# Patient Record
Sex: MALE | Race: OTHER | Employment: OTHER | URBAN - METROPOLITAN AREA
[De-identification: names, ages, dates, MRNs, and addresses within clinical notes are randomized per-mention and may not be internally consistent; named-entity substitution may affect disease eponyms.]

---

## 2022-09-22 ENCOUNTER — OFFICE VISIT (OUTPATIENT)
Dept: INTERNAL MEDICINE CLINIC | Facility: CLINIC | Age: 32
End: 2022-09-22

## 2022-09-22 VITALS
HEART RATE: 66 BPM | HEIGHT: 71 IN | WEIGHT: 200 LBS | DIASTOLIC BLOOD PRESSURE: 83 MMHG | SYSTOLIC BLOOD PRESSURE: 137 MMHG | BODY MASS INDEX: 28 KG/M2

## 2022-09-22 DIAGNOSIS — Z13.29 THYROID DISORDER SCREEN: ICD-10-CM

## 2022-09-22 DIAGNOSIS — F32.A DEPRESSION, UNSPECIFIED DEPRESSION TYPE: ICD-10-CM

## 2022-09-22 DIAGNOSIS — Z13.21 ENCOUNTER FOR VITAMIN DEFICIENCY SCREENING: ICD-10-CM

## 2022-09-22 DIAGNOSIS — Z13.0 SCREENING FOR DEFICIENCY ANEMIA: ICD-10-CM

## 2022-09-22 DIAGNOSIS — M62.81 MUSCLE STRENGTH REDUCED: ICD-10-CM

## 2022-09-22 DIAGNOSIS — Z13.220 LIPID SCREENING: ICD-10-CM

## 2022-09-22 DIAGNOSIS — Z00.00 WELLNESS EXAMINATION: Primary | ICD-10-CM

## 2022-09-22 PROCEDURE — 99385 PREV VISIT NEW AGE 18-39: CPT | Performed by: NURSE PRACTITIONER

## 2022-09-22 PROCEDURE — 3079F DIAST BP 80-89 MM HG: CPT | Performed by: NURSE PRACTITIONER

## 2022-09-22 PROCEDURE — 3075F SYST BP GE 130 - 139MM HG: CPT | Performed by: NURSE PRACTITIONER

## 2022-09-22 PROCEDURE — 3008F BODY MASS INDEX DOCD: CPT | Performed by: NURSE PRACTITIONER

## 2022-09-22 RX ORDER — BUPROPION HYDROCHLORIDE 150 MG/1
TABLET ORAL
COMMUNITY
Start: 2022-07-12 | End: 2022-09-22

## 2022-09-22 RX ORDER — DEXTROAMPHETAMINE SACCHARATE, AMPHETAMINE ASPARTATE, DEXTROAMPHETAMINE SULFATE AND AMPHETAMINE SULFATE 1.25; 1.25; 1.25; 1.25 MG/1; MG/1; MG/1; MG/1
TABLET ORAL
COMMUNITY
Start: 2022-07-24

## 2022-09-22 RX ORDER — BUPROPION HYDROCHLORIDE 150 MG/1
150 TABLET ORAL DAILY
Qty: 90 TABLET | Refills: 3 | Status: SHIPPED | OUTPATIENT
Start: 2022-09-22

## 2022-09-23 ENCOUNTER — LAB ENCOUNTER (OUTPATIENT)
Dept: LAB | Facility: HOSPITAL | Age: 32
End: 2022-09-23
Attending: NURSE PRACTITIONER

## 2022-09-23 DIAGNOSIS — M62.81 MUSCLE WEAKNESS (GENERALIZED): Primary | ICD-10-CM

## 2022-09-23 LAB
ALBUMIN SERPL-MCNC: 3.7 G/DL (ref 3.4–5)
ALBUMIN/GLOB SERPL: 0.9 {RATIO} (ref 1–2)
ALP LIVER SERPL-CCNC: 63 U/L
ALT SERPL-CCNC: 29 U/L
ANION GAP SERPL CALC-SCNC: 6 MMOL/L (ref 0–18)
AST SERPL-CCNC: 13 U/L (ref 15–37)
BASOPHILS # BLD AUTO: 0.02 X10(3) UL (ref 0–0.2)
BASOPHILS NFR BLD AUTO: 0.4 %
BILIRUB SERPL-MCNC: 0.5 MG/DL (ref 0.1–2)
BUN BLD-MCNC: 14 MG/DL (ref 7–18)
BUN/CREAT SERPL: 13.2 (ref 10–20)
CALCIUM BLD-MCNC: 8.5 MG/DL (ref 8.5–10.1)
CHLORIDE SERPL-SCNC: 106 MMOL/L (ref 98–112)
CHOLEST SERPL-MCNC: 152 MG/DL (ref ?–200)
CO2 SERPL-SCNC: 27 MMOL/L (ref 21–32)
CREAT BLD-MCNC: 1.06 MG/DL
DEPRECATED RDW RBC AUTO: 39.8 FL (ref 35.1–46.3)
EOSINOPHIL # BLD AUTO: 0.12 X10(3) UL (ref 0–0.7)
EOSINOPHIL NFR BLD AUTO: 2.6 %
ERYTHROCYTE [DISTWIDTH] IN BLOOD BY AUTOMATED COUNT: 12 % (ref 11–15)
FASTING PATIENT LIPID ANSWER: YES
FASTING STATUS PATIENT QL REPORTED: YES
GFR SERPLBLD BASED ON 1.73 SQ M-ARVRAT: 94 ML/MIN/1.73M2 (ref 60–?)
GLOBULIN PLAS-MCNC: 4 G/DL (ref 2.8–4.4)
GLUCOSE BLD-MCNC: 88 MG/DL (ref 70–99)
HCT VFR BLD AUTO: 45.3 %
HDLC SERPL-MCNC: 45 MG/DL (ref 40–59)
HGB BLD-MCNC: 15.3 G/DL
IMM GRANULOCYTES # BLD AUTO: 0.02 X10(3) UL (ref 0–1)
IMM GRANULOCYTES NFR BLD: 0.4 %
LDLC SERPL CALC-MCNC: 91 MG/DL (ref ?–100)
LYMPHOCYTES # BLD AUTO: 1.72 X10(3) UL (ref 1–4)
LYMPHOCYTES NFR BLD AUTO: 37.1 %
MCH RBC QN AUTO: 30.2 PG (ref 26–34)
MCHC RBC AUTO-ENTMCNC: 33.8 G/DL (ref 31–37)
MCV RBC AUTO: 89.3 FL
MONOCYTES # BLD AUTO: 0.56 X10(3) UL (ref 0.1–1)
MONOCYTES NFR BLD AUTO: 12.1 %
NEUTROPHILS # BLD AUTO: 2.19 X10 (3) UL (ref 1.5–7.7)
NEUTROPHILS # BLD AUTO: 2.19 X10(3) UL (ref 1.5–7.7)
NEUTROPHILS NFR BLD AUTO: 47.4 %
NONHDLC SERPL-MCNC: 107 MG/DL (ref ?–130)
OSMOLALITY SERPL CALC.SUM OF ELEC: 288 MOSM/KG (ref 275–295)
PLATELET # BLD AUTO: 197 10(3)UL (ref 150–450)
POTASSIUM SERPL-SCNC: 3.9 MMOL/L (ref 3.5–5.1)
PROT SERPL-MCNC: 7.7 G/DL (ref 6.4–8.2)
RBC # BLD AUTO: 5.07 X10(6)UL
SODIUM SERPL-SCNC: 139 MMOL/L (ref 136–145)
TRIGL SERPL-MCNC: 83 MG/DL (ref 30–149)
TSI SER-ACNC: 0.9 MIU/ML (ref 0.36–3.74)
VIT D+METAB SERPL-MCNC: 31 NG/ML (ref 30–100)
VLDLC SERPL CALC-MCNC: 13 MG/DL (ref 0–30)
WBC # BLD AUTO: 4.6 X10(3) UL (ref 4–11)

## 2022-09-23 PROCEDURE — 84443 ASSAY THYROID STIM HORMONE: CPT | Performed by: NURSE PRACTITIONER

## 2022-09-23 PROCEDURE — 84403 ASSAY OF TOTAL TESTOSTERONE: CPT

## 2022-09-23 PROCEDURE — 84402 ASSAY OF FREE TESTOSTERONE: CPT

## 2022-09-23 PROCEDURE — 82306 VITAMIN D 25 HYDROXY: CPT | Performed by: NURSE PRACTITIONER

## 2022-09-23 PROCEDURE — 80053 COMPREHEN METABOLIC PANEL: CPT | Performed by: NURSE PRACTITIONER

## 2022-09-23 PROCEDURE — 85025 COMPLETE CBC W/AUTO DIFF WBC: CPT | Performed by: NURSE PRACTITIONER

## 2022-09-23 PROCEDURE — 80061 LIPID PANEL: CPT | Performed by: NURSE PRACTITIONER

## 2022-09-23 PROCEDURE — 36415 COLL VENOUS BLD VENIPUNCTURE: CPT

## 2022-09-29 LAB
TESTOSTERONE, FREE, S: 15.5 NG/DL
TESTOSTERONE, TOTAL, S: 479 NG/DL

## 2022-12-27 RX ORDER — DEXTROAMPHETAMINE SACCHARATE, AMPHETAMINE ASPARTATE, DEXTROAMPHETAMINE SULFATE AND AMPHETAMINE SULFATE 1.25; 1.25; 1.25; 1.25 MG/1; MG/1; MG/1; MG/1
5 TABLET ORAL DAILY
Qty: 30 TABLET | Refills: 0 | Status: SHIPPED | OUTPATIENT
Start: 2022-12-27

## 2023-01-16 ENCOUNTER — OFFICE VISIT (OUTPATIENT)
Dept: INTERNAL MEDICINE CLINIC | Facility: CLINIC | Age: 33
End: 2023-01-16

## 2023-01-16 VITALS
HEIGHT: 71 IN | SYSTOLIC BLOOD PRESSURE: 129 MMHG | HEART RATE: 85 BPM | WEIGHT: 203 LBS | DIASTOLIC BLOOD PRESSURE: 76 MMHG | BODY MASS INDEX: 28.42 KG/M2

## 2023-01-16 DIAGNOSIS — R21 SKIN ERUPTION: ICD-10-CM

## 2023-01-16 DIAGNOSIS — F32.A DEPRESSION, UNSPECIFIED DEPRESSION TYPE: ICD-10-CM

## 2023-01-16 DIAGNOSIS — R10.32 LEFT LOWER QUADRANT ABDOMINAL PAIN: Primary | ICD-10-CM

## 2023-01-16 PROCEDURE — 3078F DIAST BP <80 MM HG: CPT | Performed by: NURSE PRACTITIONER

## 2023-01-16 PROCEDURE — 99214 OFFICE O/P EST MOD 30 MIN: CPT | Performed by: NURSE PRACTITIONER

## 2023-01-16 PROCEDURE — 3074F SYST BP LT 130 MM HG: CPT | Performed by: NURSE PRACTITIONER

## 2023-01-16 PROCEDURE — 3008F BODY MASS INDEX DOCD: CPT | Performed by: NURSE PRACTITIONER

## 2023-01-16 RX ORDER — EMTRICITABINE AND TENOFOVIR DISOPROXIL FUMARATE 200; 300 MG/1; MG/1
TABLET, FILM COATED ORAL
COMMUNITY
Start: 2022-12-30

## 2023-01-16 RX ORDER — BUPROPION HYDROCHLORIDE 150 MG/1
150 TABLET ORAL DAILY
Qty: 90 TABLET | Refills: 3 | Status: SHIPPED | OUTPATIENT
Start: 2023-01-16

## 2023-01-16 RX ORDER — SILDENAFIL CITRATE 20 MG/1
TABLET ORAL
COMMUNITY

## 2023-01-16 RX ORDER — METOPROLOL SUCCINATE 25 MG/1
25 TABLET, EXTENDED RELEASE ORAL DAILY
COMMUNITY
Start: 2022-11-12

## 2024-08-19 ENCOUNTER — TELEPHONE (OUTPATIENT)
Dept: HEMATOLOGY/ONCOLOGY | Facility: HOSPITAL | Age: 34
End: 2024-08-19

## 2024-08-19 ENCOUNTER — OFFICE VISIT (OUTPATIENT)
Dept: INTERNAL MEDICINE CLINIC | Facility: CLINIC | Age: 34
End: 2024-08-19
Payer: COMMERCIAL

## 2024-08-19 VITALS
WEIGHT: 191 LBS | HEIGHT: 71 IN | HEART RATE: 63 BPM | BODY MASS INDEX: 26.74 KG/M2 | SYSTOLIC BLOOD PRESSURE: 110 MMHG | DIASTOLIC BLOOD PRESSURE: 66 MMHG

## 2024-08-19 DIAGNOSIS — Z13.21 ENCOUNTER FOR VITAMIN DEFICIENCY SCREENING: ICD-10-CM

## 2024-08-19 DIAGNOSIS — Z13.0 SCREENING FOR DEFICIENCY ANEMIA: ICD-10-CM

## 2024-08-19 DIAGNOSIS — Z13.220 LIPID SCREENING: ICD-10-CM

## 2024-08-19 DIAGNOSIS — K13.79 MOUTH SORES: ICD-10-CM

## 2024-08-19 DIAGNOSIS — Z80.9 FAMILY HISTORY OF CANCER: ICD-10-CM

## 2024-08-19 DIAGNOSIS — Z00.00 WELLNESS EXAMINATION: Primary | ICD-10-CM

## 2024-08-19 DIAGNOSIS — Z13.29 THYROID DISORDER SCREEN: ICD-10-CM

## 2024-08-19 DIAGNOSIS — F90.9 ATTENTION DEFICIT HYPERACTIVITY DISORDER (ADHD), UNSPECIFIED ADHD TYPE: ICD-10-CM

## 2024-08-19 DIAGNOSIS — Z83.3 FAMILY HISTORY OF DIABETES MELLITUS: ICD-10-CM

## 2024-08-19 DIAGNOSIS — F32.A DEPRESSION, UNSPECIFIED DEPRESSION TYPE: ICD-10-CM

## 2024-08-19 PROCEDURE — 99395 PREV VISIT EST AGE 18-39: CPT | Performed by: NURSE PRACTITIONER

## 2024-08-19 PROCEDURE — 99213 OFFICE O/P EST LOW 20 MIN: CPT | Performed by: NURSE PRACTITIONER

## 2024-08-19 RX ORDER — CLONAZEPAM 1 MG/1
1 TABLET ORAL DAILY PRN
COMMUNITY
Start: 2024-08-08

## 2024-08-19 RX ORDER — BUPROPION HYDROCHLORIDE 150 MG/1
TABLET ORAL
COMMUNITY

## 2024-08-19 RX ORDER — CEPHALEXIN 500 MG/1
TABLET ORAL
COMMUNITY
Start: 2024-08-02

## 2024-08-19 NOTE — PROGRESS NOTES
David Spear is a 34 year old male.  HPI:   Pt presents to clinic for annual exam.   Hx of recent hang nail, took antibiotics.   Requests annual labs.   Requests testosterone. Denies any concerns, no ED but would like it checked.   Hx of depression, now in remission per patient. Following with psychiatrist for ADHD, was on Adderall but now taking Bupropion which is helping. Had increased anxiety with Adderall.   He reports mouth sores since childhood. He reports he saw a dentist recently and was given mouth rinse which is helping.   Pt reports family hx of breast cancer (Stage 3) diagnosed age 60's (alive and well). Father had hx of lung cancer (smoked 30 years, quit for 20 years). Paternal aunt had brain cancer. Paternal cousin had brain, another paternal cousin had pancreatic cancer. Would like genetic eval.   Current Outpatient Medications   Medication Sig Dispense Refill    buPROPion  MG Oral Tablet 24 Hr       Cephalexin 500 MG Oral Tab 1 tablet by mouth four times a day for 7 day -      clonazePAM 1 MG Oral Tab Take 1 tablet (1 mg total) by mouth daily as needed for Anxiety.      amphetamine-dextroamphetamine 5 MG Oral Tab Take 1 tablet (5 mg total) by mouth daily. 30 tablet 0      History reviewed. No pertinent past medical history.   Social History:  Social History     Socioeconomic History    Marital status:    Tobacco Use    Smoking status: Never    Smokeless tobacco: Never   Vaping Use    Vaping status: Never Used   Substance and Sexual Activity    Alcohol use: Yes     Comment: soc    Drug use: Never     Social Determinants of Health     Financial Resource Strain: Not on File (9/9/2022)    [facility]    Financial Resource Strain     Financial Resource Strain: 0   Food Insecurity: Not on File (9/9/2022)    Received from [facility]    Food Insecurity     Food: 0   Transportation Needs: Not on File (9/9/2022)    Received from [facility]    Transportation Needs      Transportation: 0   Physical Activity: Not on File (2022)    [facility] from [facility]    Physical Activity     Physical Activity: 0   Stress: Not on File (2022)    Received from [facility]    Stress     Stress: 0   Social Connections: Not on File (2022)    Received from [facility]    Social Connections     Social Connections and Isolation: 0   Housing Stability: Not on File (2022)    Received from [facility]    Housing Stability     Housin        REVIEW OF SYSTEMS:   Review of Systems   Constitutional:  Negative for activity change, appetite change, fatigue and unexpected weight change.   HENT:  Negative for congestion and dental problem.    Eyes:  Negative for discharge and visual disturbance.   Respiratory:  Negative for cough, chest tightness, shortness of breath and wheezing.    Cardiovascular:  Negative for chest pain, palpitations and leg swelling.   Gastrointestinal:  Negative for abdominal pain, constipation, diarrhea, nausea and vomiting.   Endocrine: Negative.    Genitourinary:  Negative for decreased urine volume, difficulty urinating and frequency.   Musculoskeletal:  Negative for arthralgias and back pain.   Skin:  Negative for color change, pallor and rash.   Neurological:  Negative for dizziness, seizures, numbness and headaches.   Psychiatric/Behavioral:  Negative for behavioral problems, dysphoric mood and suicidal ideas.           EXAM:   /66 (BP Location: Left arm, Patient Position: Sitting, Cuff Size: large)   Pulse 63   Ht 5' 11\" (1.803 m)   Wt 191 lb (86.6 kg)   BMI 26.64 kg/m²     Physical Exam  Vitals reviewed.   Constitutional:       Appearance: Normal appearance.   HENT:      Head: Normocephalic and atraumatic.      Right Ear: Tympanic membrane, ear canal and external ear normal. There is no impacted cerumen.      Left Ear: Tympanic membrane, ear canal and external ear normal. There is no impacted cerumen.      Nose: Nose normal.      Mouth/Throat:       Lips: No lesions.      Mouth: Mucous membranes are moist. No oral lesions.      Dentition: Normal dentition. No dental tenderness, gingival swelling or dental abscesses.      Tongue: No lesions.      Pharynx: Oropharynx is clear.   Eyes:      General: No scleral icterus.        Right eye: No discharge.         Left eye: No discharge.      Extraocular Movements: Extraocular movements intact.      Conjunctiva/sclera: Conjunctivae normal.      Pupils: Pupils are equal, round, and reactive to light.   Neck:      Thyroid: No thyroid mass or thyromegaly.   Cardiovascular:      Rate and Rhythm: Normal rate and regular rhythm.      Pulses: Normal pulses.      Heart sounds: Normal heart sounds.   Pulmonary:      Effort: Pulmonary effort is normal. No respiratory distress.      Breath sounds: Normal breath sounds.   Abdominal:      General: Abdomen is flat. Bowel sounds are normal. There is no distension.      Palpations: Abdomen is soft. There is no mass.      Tenderness: There is no abdominal tenderness.   Musculoskeletal:         General: Normal range of motion.      Cervical back: Normal range of motion and neck supple.      Right lower leg: No edema.      Left lower leg: No edema.   Lymphadenopathy:      Cervical: No cervical adenopathy.      Upper Body:      Right upper body: No supraclavicular adenopathy.      Left upper body: No supraclavicular adenopathy.   Skin:     General: Skin is warm and dry.      Capillary Refill: Capillary refill takes less than 2 seconds.      Coloration: Skin is not jaundiced.   Neurological:      General: No focal deficit present.      Mental Status: He is alert and oriented to person, place, and time.   Psychiatric:         Mood and Affect: Mood normal.         Judgment: Judgment normal.            ASSESSMENT AND PLAN:   1. Wellness examination  Education provided on healthy lifestyle.  Diet: reduce saturated fats, simple carbs and excess sugars. Hydrate. Leafy greens, legumes, nuts/seeds,  healthy sources of Omega 3, lean proteins, complex carbs, berries.   Exercise 30 min daily cardio as tolerated.   Immunizations reviewed and recommendations provided  Preventative health screening recommendations reviewed.   Previous lab and imaging results reviewed.    - Comp Metabolic Panel (14)  - CBC With Differential With Platelet  - Lipid Panel  - TSH W Reflex To Free T4  - Vitamin D, 25-Hydroxy    2. Encounter for vitamin deficiency screening  - Vitamin D, 25-Hydroxy    3. Lipid screening  - Lipid Panel    4. Screening for deficiency anemia  - CBC With Differential With Platelet    5. Thyroid disorder screen  - TSH W Reflex To Free T4    6. Depression, unspecified depression type  -Stable, Pt reports in remission. Requests testosterone testing.   - TESTOSTERONE,FREE AND TOTAL [22904][Q]    7. Attention deficit hyperactivity disorder (ADHD), unspecified ADHD type  -Following with psychiatry, did not tolerate Adderall d/t increased anxiety, taking Bupropion and reports it is helping. CPM.     8. Mouth sores  -Continue f/u with dentist, no visible ulcerations/sores today. Continue mouth rinse, will check vitamin levels.   - B12 AND FOLATE [5515][Q]  - Iron And Tibc [E]  - Ferritin [E]    9. Family history of diabetes mellitus  -A1c testing ordered.   - Hemoglobin A1C [E]    10. Family history of cancer  -Referral to Genetic counselor per patient request.   - Genetic Counselor Referral - [facility] (Jovita Cote)       The patient indicates understanding of these issues and agrees to the plan.  The patient is asked to return in 1 year. .     The above note was creating using Dragon speech recognition technology. Please excuse any typos.

## 2024-08-28 LAB
% SATURATION: 29 % (CALC) (ref 20–48)
ABSOLUTE BASOPHILS: 20 CELLS/UL (ref 0–200)
ABSOLUTE EOSINOPHILS: 71 CELLS/UL (ref 15–500)
ABSOLUTE LYMPHOCYTES: 2105 CELLS/UL (ref 850–3900)
ABSOLUTE MONOCYTES: 496 CELLS/UL (ref 200–950)
ABSOLUTE NEUTROPHILS: 707 CELLS/UL (ref 1500–7800)
ALBUMIN/GLOBULIN RATIO: 1.6 (CALC) (ref 1–2.5)
ALBUMIN: 4.4 G/DL (ref 3.6–5.1)
ALKALINE PHOSPHATASE: 53 U/L (ref 36–130)
ALT: 17 U/L (ref 9–46)
AST: 22 U/L (ref 10–40)
BASOPHILS: 0.6 %
BILIRUBIN, TOTAL: 0.5 MG/DL (ref 0.2–1.2)
BUN: 19 MG/DL (ref 7–25)
CALCIUM: 8.7 MG/DL (ref 8.6–10.3)
CARBON DIOXIDE: 28 MMOL/L (ref 20–32)
CHLORIDE: 106 MMOL/L (ref 98–110)
CHOL/HDLC RATIO: 3.4 (CALC)
CHOLESTEROL, TOTAL: 167 MG/DL
CREATININE: 1.22 MG/DL (ref 0.6–1.26)
EGFR: 79 ML/MIN/1.73M2
EOSINOPHILS: 2.1 %
FERRITIN: 81 NG/ML (ref 38–380)
FOLATE, SERUM: 19.8 NG/ML
FREE TESTOSTERONE: 98.8 PG/ML (ref 35–155)
GLOBULIN: 2.7 G/DL (CALC) (ref 1.9–3.7)
GLUCOSE: 93 MG/DL (ref 65–99)
HDL CHOLESTEROL: 49 MG/DL
HEMATOCRIT: 41.9 % (ref 38.5–50)
HEMOGLOBIN A1C: 5.8 % OF TOTAL HGB
HEMOGLOBIN: 14 G/DL (ref 13.2–17.1)
IRON BINDING CAPACITY: 309 MCG/DL (CALC) (ref 250–425)
IRON, TOTAL: 91 MCG/DL (ref 50–180)
LDL-CHOLESTEROL: 101 MG/DL (CALC)
LYMPHOCYTES: 61.9 %
MCH: 30.3 PG (ref 27–33)
MCHC: 33.4 G/DL (ref 32–36)
MCV: 90.7 FL (ref 80–100)
MONOCYTES: 14.6 %
MPV: 10.7 FL (ref 7.5–12.5)
NEUTROPHILS: 20.8 %
NON-HDL CHOLESTEROL: 118 MG/DL (CALC)
PLATELET COUNT: 156 THOUSAND/UL (ref 140–400)
POTASSIUM: 4.5 MMOL/L (ref 3.5–5.3)
PROTEIN, TOTAL: 7.1 G/DL (ref 6.1–8.1)
RDW: 12.7 % (ref 11–15)
RED BLOOD CELL COUNT: 4.62 MILLION/UL (ref 4.2–5.8)
SODIUM: 140 MMOL/L (ref 135–146)
TESTOSTERONE, TOTAL,$/MS/MS: 601 NG/DL (ref 250–1100)
TRIGLYCERIDES: 82 MG/DL
TSH W/REFLEX TO FT4: 1.17 MIU/L (ref 0.4–4.5)
VITAMIN B12: 572 PG/ML (ref 200–1100)
VITAMIN D, 25-OH, TOTAL: 72 NG/ML (ref 30–100)
WHITE BLOOD CELL COUNT: 3.4 THOUSAND/UL (ref 3.8–10.8)

## 2024-09-03 ENCOUNTER — TELEPHONE (OUTPATIENT)
Dept: GENETICS | Facility: HOSPITAL | Age: 34
End: 2024-09-03

## 2024-09-03 DIAGNOSIS — D72.819 LEUKOPENIA, UNSPECIFIED TYPE: Primary | ICD-10-CM

## 2024-09-09 ENCOUNTER — TELEPHONE (OUTPATIENT)
Dept: GENETICS | Facility: HOSPITAL | Age: 34
End: 2024-09-09

## 2024-09-20 ENCOUNTER — GENETICS ENCOUNTER (OUTPATIENT)
Dept: GENETICS | Facility: HOSPITAL | Age: 34
End: 2024-09-20
Attending: INTERNAL MEDICINE
Payer: COMMERCIAL

## 2024-09-20 ENCOUNTER — APPOINTMENT (OUTPATIENT)
Dept: HEMATOLOGY/ONCOLOGY | Facility: HOSPITAL | Age: 34
End: 2024-09-20
Attending: INTERNAL MEDICINE
Payer: COMMERCIAL

## 2024-09-20 DIAGNOSIS — Z80.3 FAMILY HISTORY OF BREAST CANCER: Primary | ICD-10-CM

## 2024-09-20 DIAGNOSIS — Z80.0 FAMILY HISTORY OF PANCREATIC CANCER: ICD-10-CM

## 2024-09-20 DIAGNOSIS — Z80.9 FAMILY HISTORY OF CANCER: ICD-10-CM

## 2024-09-20 PROCEDURE — 96040 HC GENETIC COUNSELING EA 30 MIN: CPT | Performed by: GENETIC COUNSELOR, MS

## 2024-09-20 NOTE — PROGRESS NOTES
Patient Name: David Spear  YOB: 1990  Date of Visit: 2024    Reason for visit: Mr. Spear was seen for the purposes of genetic counseling due to a family history of cancer. The purpose of this visit was to review information regarding genetic testing options for mutations in high penetrance cancer susceptibility genes.    Referring Provider: PRIYA Soto    Medical History: Mr. Spear is a pleasant and generally healthy 34 year old male presenting with no personal history of cancer.    Relevant Family History: Mr. Spear has 1 son (5y).    He has 3 brothers and 2 sisters with no cancer hx.     His mother (67y) was dx with stage III breast cancer ~62y s/p lumpectomy, RT, and chemo. There are 6 maternal aunts (all >50y) and 3 maternal uncles (1  in his 30s dt an accident; 2 are >50y), all with no cancer hx in them or their children. The maternal grandmother (late-80s) has a h/o heart issues and diabetes, no cancer hx. The maternal grandfather  in his late-80s dt a MI with no cancer hx.    Mr. Spear's father  at 52y dt a MVA with a h/o lung cancer dx ~45y s/p lobectomy, he had a h/o smoking. There are 3 paternal aunts (1  in her 50-60s dt a brain tumor dx >50y; 2 others >50y) and 3 paternal uncles (all >50y). One of the paternal uncles has 3 daughters and 2 sons, 1 daughter  in her 40s dt a brain tumor and 1 son  in his late-40s dt pancreatic cancer. No other cancer hx is noted in the paternal cousins. The paternal grandparents both  in their 80s with no cancer hx. The rest of the family history is negative for other significant genetic conditions, cancers, or birth defects of any kind. See scanned pedigree for full family history reported during the session.    Mr. Spear's maternal and paternal ethnicity is [place].    Summary:   The following was reviewed with Mr. Spear:  The majority of cancers are sporadic whereas approximately 10-30% of cases of  cancer cases are attributed to familial factors such as unidentified low penetrance genes in the family or shared environmental factors.  Approximately 5-10% of cancers are related to a hereditary cancer syndrome.  Signs of a hereditary cancer syndrome include some rare cancers, common cancers occurring at unusually young ages, multiple primary cancers in the same individual, multiple colorectal polyps, or the same type of cancer or related cancers (e.g., breast and ovarian, colorectal and endometrial) in three or more individuals in the same lineage.     Cancer is usually caused by gene mutations that occur randomly in one or a few cells of the body. Such gene changes, called somatic mutations, may arise as a natural consequence of aging or when a cell’s DNA has been damaged. Acquired mutations are only present in some of the body’s cells, and they are not passed on from parents to their children.    However, in the small percentage of people with a hereditary cancer syndrome, the disease is due to a different type of mutation called a hereditary mutation, or germline mutation. These mutations are usually inherited from one or both of the person’s parents, and are present in nearly every cell of the body. Because hereditary mutations are present in the DNA of sperm and egg cells, they can be passed down in families. People who carry such hereditary mutations do not necessarily get cancer, but their risk of developing the disease at some point during their lifetime is higher than average. When a personal and/or family history doesn't clearly fit a single hereditary cancer syndrome, panel genetic testing examining multiple genes in which pathogenic mutations cause hereditary cancer syndromes is appropriate.    The PREMM5 model is a clinical prediction algorithm that estimates the cumulative probability of an individual carrying a germline mutation in the MLH1, MSH2, MSH6, PMS2, or EPCAM genes. Mutations in these  genes cause Kimbrough syndrome, an inherited cancer predisposition syndrome. Using the PREMM5 model, Mr. Spear was estimated to have a 2.3% probability to have a germline mutation in one of the five Kimbrough Syndrome genes. If the overall predicted probability is 2.5% or greater, referral for genetic evaluation is recommended. His risk was currently calculated to be below the 2.5% threshold.     Based on the personal and family history information presented in the session, Mr. Spear currently does not meet criteria for genetic testing of high-penetrance cancer susceptibility genes based on the NCCN guidelines (BOP/Kimbrough/others). Because of this reason, Mr. Spear's insurance ([employer]) likely will not cover genetic testing. Mr. Spear was advised that there is a $249 self-pay option for testing through the laboratory [facility], this is usually eligible for FSA/HSA reimbursement.     Mr. Spear appeared to understand the information presented. On the day of the visit Mr. Spear elected not to proceed with genetic testing for hereditary cancer syndromes.     Plan:   No genetic testing was ordered/indicated, Mr. Spear elected not to proceed with any hereditary cancer genetic testing today.   He will contact me should he wish to proceed with genetic testing in the future or should his personal/family history of cancer change.    He was encouraged to continue to participate in routine health screening/surveillance, I reviewed general guidelines for a discussion about prostate cancer screening at 40y and colonoscopy at 45y for average-risk persons.    Thank you for allowing me to participate in the care of your patient; please do not hesitate to contact my office if you have any questions or concerns, [phone].    Send to: [facility]  Time spent with patient: 35 minutes      Jazmyne Santos MS, [facility]

## 2025-01-20 ENCOUNTER — LAB ENCOUNTER (OUTPATIENT)
Dept: LAB | Facility: HOSPITAL | Age: 35
End: 2025-01-20
Attending: NURSE PRACTITIONER
Payer: COMMERCIAL

## 2025-01-20 ENCOUNTER — OFFICE VISIT (OUTPATIENT)
Dept: INTERNAL MEDICINE CLINIC | Facility: CLINIC | Age: 35
End: 2025-01-20
Payer: COMMERCIAL

## 2025-01-20 ENCOUNTER — LAB ENCOUNTER (OUTPATIENT)
Dept: LAB | Age: 35
End: 2025-01-20
Attending: NURSE PRACTITIONER
Payer: COMMERCIAL

## 2025-01-20 VITALS
SYSTOLIC BLOOD PRESSURE: 117 MMHG | RESPIRATION RATE: 16 BRPM | DIASTOLIC BLOOD PRESSURE: 69 MMHG | TEMPERATURE: 97 F | OXYGEN SATURATION: 98 % | HEART RATE: 60 BPM | WEIGHT: 198 LBS | BODY MASS INDEX: 28 KG/M2

## 2025-01-20 DIAGNOSIS — R05.1 ACUTE COUGH: ICD-10-CM

## 2025-01-20 DIAGNOSIS — Z31.41 FERTILITY TESTING: Primary | ICD-10-CM

## 2025-01-20 DIAGNOSIS — Z31.41 FERTILITY TESTING: ICD-10-CM

## 2025-01-20 PROBLEM — T14.90XA INJURY, UNSPECIFIED, INITIAL ENCOUNTER: Status: ACTIVE | Noted: 2020-12-22

## 2025-01-20 PROBLEM — R12 HEART BURN: Status: ACTIVE | Noted: 2020-12-22

## 2025-01-20 LAB
BASOPHILS # BLD AUTO: 0.02 X10(3) UL (ref 0–0.2)
BASOPHILS NFR BLD AUTO: 0.3 %
DEPRECATED RDW RBC AUTO: 41.5 FL (ref 35.1–46.3)
EOSINOPHIL # BLD AUTO: 0.06 X10(3) UL (ref 0–0.7)
EOSINOPHIL NFR BLD AUTO: 1 %
ERYTHROCYTE [DISTWIDTH] IN BLOOD BY AUTOMATED COUNT: 12.5 % (ref 11–15)
HCT VFR BLD AUTO: 42.4 %
HGB BLD-MCNC: 14.4 G/DL
IMM GRANULOCYTES # BLD AUTO: 0.01 X10(3) UL (ref 0–1)
IMM GRANULOCYTES NFR BLD: 0.2 %
LYMPHOCYTES # BLD AUTO: 1.88 X10(3) UL (ref 1–4)
LYMPHOCYTES NFR BLD AUTO: 32.5 %
MCH RBC QN AUTO: 30.6 PG (ref 26–34)
MCHC RBC AUTO-ENTMCNC: 34 G/DL (ref 31–37)
MCV RBC AUTO: 90.2 FL
MONOCYTES # BLD AUTO: 0.67 X10(3) UL (ref 0.1–1)
MONOCYTES NFR BLD AUTO: 11.6 %
NEUTROPHILS # BLD AUTO: 3.15 X10 (3) UL (ref 1.5–7.7)
NEUTROPHILS # BLD AUTO: 3.15 X10(3) UL (ref 1.5–7.7)
NEUTROPHILS NFR BLD AUTO: 54.4 %
PH SMN: 8.5 [PH] (ref 7.2–8.3)
PLATELET # BLD AUTO: 151 10(3)UL (ref 150–450)
RBC # BLD AUTO: 4.7 X10(6)UL
SPECIMEN VOL SMN: 6 ML (ref 1.5–6)
SPERM # SMN: 125.6 MILL/ML (ref 15–150)
SPERM IMMOTILE NFR SMN: 56 %
SPERM IMMOTILE NFR SMN: 9 %
SPERM PROG NFR SMN: 35 %
VISC SMN QL: NORMAL
WBC # BLD AUTO: 5.8 X10(3) UL (ref 4–11)

## 2025-01-20 PROCEDURE — 36415 COLL VENOUS BLD VENIPUNCTURE: CPT | Performed by: NURSE PRACTITIONER

## 2025-01-20 PROCEDURE — 99213 OFFICE O/P EST LOW 20 MIN: CPT | Performed by: NURSE PRACTITIONER

## 2025-01-20 PROCEDURE — 85025 COMPLETE CBC W/AUTO DIFF WBC: CPT | Performed by: NURSE PRACTITIONER

## 2025-01-20 PROCEDURE — 89320 SEMEN ANAL VOL/COUNT/MOT: CPT

## 2025-01-20 NOTE — PROGRESS NOTES
David Spear is a 34 year old male.  HPI:   Pt Presents to clinic requesting semen analysis as part of fertility evaluation his wife is undergoing.   Pt reports cough that is mild, intermittent for the past 2 days. Denies any CP, SOB, wheezing, chills, fever, body aches, sinus pressure or pain, runny nose, post nasal drainage, dizziness, HA, abdominal pain, NVDC. He reports child recently sick from school. No recent travel. Not taking anything for symptoms. Would like to be tested for covid.   Current Outpatient Medications   Medication Sig Dispense Refill    amphetamine-dextroamphetamine 5 MG Oral Tab Take 1 tablet (5 mg total) by mouth daily. 30 tablet 0    buPROPion  MG Oral Tablet 24 Hr  (Patient not taking: Reported on 1/20/2025)      Cephalexin 500 MG Oral Tab 1 tablet by mouth four times a day for 7 day - (Patient not taking: Reported on 1/20/2025)      clonazePAM 1 MG Oral Tab Take 1 tablet (1 mg total) by mouth daily as needed for Anxiety. (Patient not taking: Reported on 1/20/2025)        History reviewed. No pertinent past medical history.   Social History:  Social History     Socioeconomic History    Marital status:    Tobacco Use    Smoking status: Never    Smokeless tobacco: Never   Vaping Use    Vaping status: Never Used   Substance and Sexual Activity    Alcohol use: Yes     Comment: soc    Drug use: Never     Social Drivers of Health     Financial Resource Strain: Patient Declined (11/18/2024)    Received from [facility]    Overall Financial Resource Strain (CARDIA)     Difficulty of Paying Living Expenses: Patient declined   Food Insecurity: No Food Insecurity (1/20/2025)    NCSS - Food Insecurity     Worried About Running Out of Food in the Last Year: No     Ran Out of Food in the Last Year: No   Transportation Needs: No Transportation Needs (1/20/2025)    NCSS - Transportation     Lack of Transportation: No   Physical Activity: Not on File (9/9/2022)     [facility] from [facility]    Physical Activity     Physical Activity: 0   Stress: Not on File (9/9/2022)    Received from [facility]    Stress     Stress: 0   Social Connections: Not on File (9/13/2024)    Received from [facility]    Social Connections     Connectedness: 0   Housing Stability: Not At Risk (1/20/2025)    NCSS - Housing/Utilities     Has Housing: Yes     Worried About Losing Housing: No     Unable to Get Utilities: No        REVIEW OF SYSTEMS:   Review of Systems   All other systems reviewed and are negative.         EXAM:   /69 (BP Location: Left arm, Patient Position: Sitting, Cuff Size: large)   Pulse 60   Temp 97.1 °F (36.2 °C)   Resp 16   Wt 198 lb (89.8 kg)   SpO2 98%   BMI 27.62 kg/m²     Physical Exam  Vitals reviewed.   Constitutional:       General: He is not in acute distress.     Appearance: Normal appearance. He is not ill-appearing, toxic-appearing or diaphoretic.   HENT:      Head: Normocephalic and atraumatic.      Right Ear: Tympanic membrane, ear canal and external ear normal. There is no impacted cerumen.      Left Ear: Tympanic membrane, ear canal and external ear normal. There is no impacted cerumen.      Nose: Nose normal. No congestion or rhinorrhea.      Mouth/Throat:      Mouth: Mucous membranes are moist.      Pharynx: Oropharynx is clear. No oropharyngeal exudate or posterior oropharyngeal erythema.   Eyes:      General: No scleral icterus.        Right eye: No discharge.         Left eye: No discharge.      Conjunctiva/sclera: Conjunctivae normal.      Pupils: Pupils are equal, round, and reactive to light.   Neck:      Thyroid: No thyroid mass or thyromegaly.   Cardiovascular:      Rate and Rhythm: Normal rate and regular rhythm.      Pulses: Normal pulses.      Heart sounds: Normal heart sounds.   Pulmonary:      Effort: Pulmonary effort is normal. No respiratory distress.      Breath sounds: Normal breath sounds. No stridor. No wheezing, rhonchi or rales.    Chest:      Chest wall: No tenderness.   Abdominal:      General: Abdomen is flat.   Musculoskeletal:      Cervical back: Normal range of motion and neck supple. No rigidity.   Lymphadenopathy:      Cervical: No cervical adenopathy.      Upper Body:      Right upper body: No supraclavicular adenopathy.      Left upper body: No supraclavicular adenopathy.   Skin:     General: Skin is warm.      Coloration: Skin is not jaundiced.   Neurological:      General: No focal deficit present.      Mental Status: He is alert and oriented to person, place, and time.   Psychiatric:         Mood and Affect: Mood normal.         Judgment: Judgment normal.            ASSESSMENT AND PLAN:   1. Fertility testing  -Testing ordered per patient request.   - Fertility Semen Analysis  - Fertility Semen Analysis; Future    2. Acute cough  -Unremarkable exam.   -Covid testing ordered. Reviewed CDC guidelines.   Nasal saline 2 sprays in each nostril every 3-4 hours as needed.   Tylenol 500 mg every 6-8 hours as needed (max dose 3000 mg/day).   Hydrate, humidifier, rest, honey/elderberry   - COVID-19 Testing by PCR ([facility]) [E]; Future  - COVID-19 Testing by PCR ([facility]) [E]       The patient indicates understanding of these issues and agrees to the plan.  The patient is asked to return in PRN.     The above note was creating using Dragon speech recognition technology. Please excuse any typos.

## 2025-01-21 LAB — SARS-COV-2 RNA RESP QL NAA+PROBE: NOT DETECTED

## 2025-02-10 ENCOUNTER — OFFICE VISIT (OUTPATIENT)
Dept: SURGERY | Facility: CLINIC | Age: 35
End: 2025-02-10

## 2025-02-10 DIAGNOSIS — N46.9 MALE INFERTILITY: Primary | ICD-10-CM

## 2025-02-10 PROCEDURE — 99204 OFFICE O/P NEW MOD 45 MIN: CPT | Performed by: UROLOGY

## 2025-02-10 NOTE — PROGRESS NOTES
SUBJECTIVE:  David Spear is a 34 year old male who presents for a consultation at the request of, and a copy of this note will be sent to, Gertrudis [facility], for evaluation of male infertility. He states that the problem is unchanged. Symptoms include him and his 39-year-old wife have a child that they conceived naturally.  Have been trying for about 8 months unsuccessfully to have a second child.  Wife reportedly had a D&C a year ago complicated by sepsis and postoperative ICU admission.  She has been to the obstetrician but details of her workup are not available.  The patient denies any erection, ejaculation or urination complaints.  He denies any recreational drug use, vaping, smoking or alcohol consumption.  Has been taking a DHEA containing supplement but he states he stopped about 2 to 3 weeks ago.  No previous urologic history.  No family history of fertility issues.. He denies any other complaints.    Allergies: Allergies[1]    History:  No past medical history on file.   No past surgical history on file.   Family History   Problem Relation Age of Onset    Breast Cancer Mother 62        s/p surgery, RT, chemo    Cancer Father 45        lung cancer s/p surgery, former smoker    Accidental death Father 52        MVA    Heart Attack Maternal Grandfather 88    Brain Cancer Paternal Aunt          50-60s dt brain tumor    Brain Cancer Paternal Cousin Female          late-40s dt brain tumor    Pancreatic Cancer Paternal Cousin Male          40s      Social History:   Social History     Socioeconomic History    Marital status:    Tobacco Use    Smoking status: Never    Smokeless tobacco: Never   Vaping Use    Vaping status: Never Used   Substance and Sexual Activity    Alcohol use: Yes     Comment: soc    Drug use: Never     Social Drivers of Health     Food Insecurity: No Food Insecurity (2025)    NCSS - Food Insecurity     Worried About Running Out of Food in the Last Year: No     Ran  Out of Food in the Last Year: No   Transportation Needs: No Transportation Needs (1/20/2025)    NCSS - Transportation     Lack of Transportation: No   Stress: Not on File (9/9/2022)    [facility]    Stress     Stress: 0   Housing Stability: Not At Risk (1/20/2025)    NCSS - Housing/Utilities     Has Housing: Yes     Worried About Losing Housing: No     Unable to Get Utilities: No            REVIEW OF SYSTEMS:  RESPIRATORY:  Negative for chest tightness, wheezing, cough, shortness of breath,  sputum production,chest pain or pleurisy.  CARDIOVASCULAR:  Negative for pain or chest discomfort, dizziness or fainting, palpitations, leg swelling, nocturia, or claudication.  GASTROINTESTINAL:  Negative for nausea, vomiting, diarrhea, constipation, heartburn or indigestion, abdominal pains, bloody or tarry stools.  GENERAL: Denies:  weight gain, weight loss, fever, night sweats, bone pain, malaise, and fatigue. Positive for:  no all other review of systems reviewed and otherwise negativene.      OBJECTIVE:  There were no vitals taken for this visit.  He appears well, in no apparent distress.  Alert and oriented times three, pleasant and cooperative.  CARDIOVASCULAR:normal apical impulse  RESPIRATORY: no chest wall deformities or tenderness  ABDOMEN: abdomen is soft without significant tenderness, masses, organomegaly or guarding  GENITOURINARY:      Penis: no penile lesions or discharge. Meatus normal location and size.      Scrotum: normal in appearance      Right Epididymis and Vas: both are palpably normal.      Right Testis: normal        Left Epididymis and Vas: both are palpably normal.      Left Testis: normal        Inguinal Lymph Nodes: non-palpable both      Skin exam grossly normal  Intact neurologically grossly  ASSESSMENT/PLAN  Encounter Diagnosis   Name Primary?    Male infertility Yes   Recommend:  - Encouraged the patient to continue to try with his wife for 4 additional months.  - If no  success, repeat semen analysis and follow-up at that time.  - I also encouraged the patient's wife to follow-up with her obstetrician for additional testing as she does not appear to have had ultrasounds or x-rays done.    No orders of the defined types were placed in this encounter.      Meds This Visit:  Requested Prescriptions      No prescriptions requested or ordered in this encounter       Imaging & Referrals:  None                        [1] No Known Allergies